# Patient Record
Sex: FEMALE | Race: WHITE | NOT HISPANIC OR LATINO | URBAN - METROPOLITAN AREA
[De-identification: names, ages, dates, MRNs, and addresses within clinical notes are randomized per-mention and may not be internally consistent; named-entity substitution may affect disease eponyms.]

---

## 2018-03-15 ENCOUNTER — OFFICE VISIT (OUTPATIENT)
Dept: FAMILY MEDICINE | Facility: CLINIC | Age: 28
End: 2018-03-15
Payer: COMMERCIAL

## 2018-03-15 VITALS
OXYGEN SATURATION: 98 % | WEIGHT: 123.31 LBS | TEMPERATURE: 99 F | DIASTOLIC BLOOD PRESSURE: 65 MMHG | SYSTOLIC BLOOD PRESSURE: 112 MMHG | HEART RATE: 82 BPM | RESPIRATION RATE: 18 BRPM

## 2018-03-15 DIAGNOSIS — H81.21 VESTIBULAR NEURONITIS OF RIGHT EAR: ICD-10-CM

## 2018-03-15 PROCEDURE — 99213 OFFICE O/P EST LOW 20 MIN: CPT | Mod: ,,, | Performed by: INTERNAL MEDICINE

## 2018-03-15 RX ORDER — MECLIZINE HYDROCHLORIDE 25 MG/1
25 TABLET ORAL 3 TIMES DAILY PRN
Qty: 30 TABLET | Refills: 0 | Status: SHIPPED | OUTPATIENT
Start: 2018-03-15 | End: 2018-07-18 | Stop reason: SDUPTHER

## 2018-03-15 RX ORDER — MECLIZINE HYDROCHLORIDE 25 MG/1
TABLET ORAL
COMMUNITY
Start: 2018-03-05 | End: 2018-03-15 | Stop reason: SDUPTHER

## 2018-03-15 NOTE — PROGRESS NOTES
Adult Urgent Visit    Chief Complaint   Patient presents with    Follow-up    Dizziness       28 yo woman here with c/o vertigo.  About 2 weeks ago pt started with intense vertigo, n/v. Went to urgent care a few days later and was given meclizine, steroid shot for fluid in the middle ear.  Pt denies any allergy or URI symptoms.  No recent head trauma. No syncope, seizure, weakness or numbness, speech difficulty, chest pain, palpitations associated with the episodes.     Dizziness:    Associated symptoms: nausea and vomiting.no hearing loss, no ear congestion, no ear pain, no fever, no headaches, no tinnitus, no diaphoresis, no aural fullness, no weakness, no visual disturbances, no light-headedness, no syncope, no palpitations, no panic, no facial weakness, no slurred speech, no numbness in extremities and no chest pain.      Review of Systems   Constitutional: Negative for diaphoresis and fever.   HENT: Negative for ear pain, hearing loss and tinnitus.    Cardiovascular: Negative for chest pain, palpitations and syncope.   Gastrointestinal: Positive for nausea and vomiting.   Neurological: Positive for dizziness. Negative for weakness, light-headedness and headaches.       Past medical, social and family history reviewed and there are no pertinent changes.       Current Outpatient Prescriptions:     copper (PARAGARD T 380A) 380 square mm IUD, by Intrauterine route., Disp: , Rfl:     meclizine (ANTIVERT) 25 mg tablet, Take 1 tablet (25 mg total) by mouth 3 (three) times daily as needed for Dizziness., Disp: 30 tablet, Rfl: 0    Vitals:    03/15/18 1359   BP: 112/65   Pulse: 82   Resp: 18   Temp: 98.9 °F (37.2 °C)   TempSrc: Oral   SpO2: 98%   Weight: 55.9 kg (123 lb 4.8 oz)       Physical Exam   Constitutional: She is oriented to person, place, and time. She appears well-developed and well-nourished. No distress.   Eyes: Pupils are equal, round, and reactive to light. Right eye exhibits  nystagmus. Right eye exhibits normal extraocular motion. Left eye exhibits normal extraocular motion and no nystagmus.   Cardiovascular: Regular rhythm, normal heart sounds and intact distal pulses.    No murmur heard.  Pulmonary/Chest: Effort normal and breath sounds normal. She has no wheezes. She has no rales.   Musculoskeletal: She exhibits no edema.   Neurological: She is alert and oriented to person, place, and time. She has normal strength and normal reflexes. No cranial nerve deficit or sensory deficit. She displays a negative Romberg sign. Coordination and gait normal.       Asessment/Plan:  Charlene is a 27 y.o. female here with complaint of Follow-up and Dizziness  Most likely vestibular neuritis, resolving.  Continue PRN meclizine. If not improving, will refer to ENT.       Problem List Items Addressed This Visit        ENT    Vestibular neuronitis of right ear    Relevant Medications    meclizine (ANTIVERT) 25 mg tablet

## 2018-03-15 NOTE — PATIENT INSTRUCTIONS
Please call if you are not continuing to improve over the next 2-3 weeks.  Go to the ER for evaluation if you have sudden worsening of dizziness, speech difficulties, weakness or numbness.

## 2018-07-18 DIAGNOSIS — H81.21 VESTIBULAR NEURONITIS OF RIGHT EAR: ICD-10-CM

## 2018-07-18 RX ORDER — MECLIZINE HYDROCHLORIDE 25 MG/1
25 TABLET ORAL 3 TIMES DAILY PRN
Qty: 30 TABLET | Refills: 0 | Status: SHIPPED | OUTPATIENT
Start: 2018-07-18

## 2018-07-18 NOTE — TELEPHONE ENCOUNTER
----- Message from Henny Anne sent at 7/17/2018  5:08 PM CDT -----  Regarding: RX REFILL REQUEST  Contact: [phone]  Patient has appointment on 9/4/18 and will run out of medicine for vertigo and is requesting a refill.  Thank You!

## 2021-12-07 DIAGNOSIS — N64.4 MASTODYNIA: Primary | ICD-10-CM

## 2022-03-25 ENCOUNTER — HOSPITAL ENCOUNTER (OUTPATIENT)
Dept: RADIOLOGY | Facility: HOSPITAL | Age: 32
Discharge: HOME | End: 2022-03-25
Attending: SPECIALIST
Payer: COMMERCIAL

## 2022-03-25 VITALS — WEIGHT: 123.25 LBS

## 2022-03-25 DIAGNOSIS — N64.4 MASTODYNIA: ICD-10-CM

## 2022-03-25 PROCEDURE — 77066 DX MAMMO INCL CAD BI: CPT | Mod: TC,PO
